# Patient Record
Sex: FEMALE | Race: WHITE | Employment: FULL TIME | URBAN - METROPOLITAN AREA
[De-identification: names, ages, dates, MRNs, and addresses within clinical notes are randomized per-mention and may not be internally consistent; named-entity substitution may affect disease eponyms.]

---

## 2024-07-21 ENCOUNTER — APPOINTMENT (EMERGENCY)
Dept: RADIOLOGY | Facility: HOSPITAL | Age: 34
End: 2024-07-21
Payer: COMMERCIAL

## 2024-07-21 ENCOUNTER — HOSPITAL ENCOUNTER (EMERGENCY)
Facility: HOSPITAL | Age: 34
Discharge: HOME | End: 2024-07-21
Attending: EMERGENCY MEDICINE
Payer: COMMERCIAL

## 2024-07-21 VITALS
DIASTOLIC BLOOD PRESSURE: 60 MMHG | SYSTOLIC BLOOD PRESSURE: 119 MMHG | RESPIRATION RATE: 18 BRPM | WEIGHT: 150 LBS | OXYGEN SATURATION: 99 % | TEMPERATURE: 99 F | BODY MASS INDEX: 24.11 KG/M2 | HEART RATE: 98 BPM | HEIGHT: 66 IN

## 2024-07-21 DIAGNOSIS — M25.511 ACUTE PAIN OF RIGHT SHOULDER: Primary | ICD-10-CM

## 2024-07-21 DIAGNOSIS — M89.8X1 PAIN OF RIGHT CLAVICLE: ICD-10-CM

## 2024-07-21 PROCEDURE — 99283 EMERGENCY DEPT VISIT LOW MDM: CPT

## 2024-07-21 PROCEDURE — 73030 X-RAY EXAM OF SHOULDER: CPT

## 2024-07-21 PROCEDURE — 99284 EMERGENCY DEPT VISIT MOD MDM: CPT | Performed by: EMERGENCY MEDICINE

## 2024-07-21 PROCEDURE — 73000 X-RAY EXAM OF COLLAR BONE: CPT

## 2024-07-21 RX ORDER — LIDOCAINE 50 MG/G
1 PATCH TOPICAL ONCE
Status: DISCONTINUED | OUTPATIENT
Start: 2024-07-21 | End: 2024-07-21 | Stop reason: HOSPADM

## 2024-07-21 RX ADMIN — LIDOCAINE 1 PATCH: 700 PATCH TOPICAL at 08:42

## 2024-07-21 NOTE — ED PROVIDER NOTES
"History  Chief Complaint   Patient presents with    Shoulder Pain     Patient c/o R deep achy shoulder pain that started yesterday and worsened this morning. Pt reports, \"I was in the pool with my little sisters sitting on my shoulders\". Pt self administered 600 mg Advil at 0800 with minimal relief. Denies numbness and tingling.      34-year-old female presents to the emergency department for evaluation of right shoulder/collarbone pain.  The patient states that she was playing in the pool yesterday and had her sister standing on her shoulders.  She reports waking up this morning with increased pain to the area.  States that she took 600 mg of Advil prior to arrival.  Reports that she is concerned that she might have a fracture so came to the emergency department for further evaluation.  She denies any recent falls.  No localized numbness, tingling or weakness.        None       No past medical history on file.    No past surgical history on file.    No family history on file.  I have reviewed and agree with the history as documented.    E-Cigarette/Vaping    E-Cigarette Use Never User      E-Cigarette/Vaping Substances     Social History     Tobacco Use    Smoking status: Never    Smokeless tobacco: Never   Vaping Use    Vaping status: Never Used   Substance Use Topics    Alcohol use: Yes     Comment: Occassionally    Drug use: Not Currently       Review of Systems   Constitutional:  Negative for chills and fever.   HENT:  Negative for ear pain and sore throat.    Eyes:  Negative for pain and visual disturbance.   Respiratory:  Negative for cough and shortness of breath.    Cardiovascular:  Negative for chest pain and palpitations.   Gastrointestinal:  Negative for abdominal pain and vomiting.   Genitourinary:  Negative for dysuria and hematuria.   Musculoskeletal:  Negative for arthralgias and back pain.   Skin:  Negative for color change and rash.   Neurological:  Negative for seizures and syncope.   All other " systems reviewed and are negative.      Physical Exam  Physical Exam  Vitals and nursing note reviewed.   Constitutional:       General: She is not in acute distress.     Appearance: She is well-developed.   HENT:      Head: Normocephalic and atraumatic.   Eyes:      Conjunctiva/sclera: Conjunctivae normal.   Cardiovascular:      Rate and Rhythm: Normal rate and regular rhythm.      Pulses:           Radial pulses are 2+ on the right side and 2+ on the left side.      Heart sounds: No murmur heard.  Pulmonary:      Effort: Pulmonary effort is normal. No respiratory distress.      Breath sounds: Normal breath sounds.   Chest:       Abdominal:      Palpations: Abdomen is soft.      Tenderness: There is no abdominal tenderness.   Musculoskeletal:         General: Tenderness present. No swelling.      Right shoulder: Tenderness present.        Arms:       Cervical back: Neck supple.      Comments: Distal upper extremity pulse, motor and sensation intact and symmetric bilaterally   Skin:     General: Skin is warm and dry.      Capillary Refill: Capillary refill takes less than 2 seconds.   Neurological:      Mental Status: She is alert.   Psychiatric:         Mood and Affect: Mood normal.         Vital Signs  ED Triage Vitals [07/21/24 0824]   Temperature Pulse Respirations Blood Pressure SpO2   99 °F (37.2 °C) 98 18 119/60 99 %      Temp Source Heart Rate Source Patient Position - Orthostatic VS BP Location FiO2 (%)   Oral Monitor Lying Left arm --      Pain Score       --           Vitals:    07/21/24 0824   BP: 119/60   Pulse: 98   Patient Position - Orthostatic VS: Lying         Visual Acuity      ED Medications  Medications   lidocaine (LIDODERM) 5 % patch 1 patch (1 patch Topical Medication Applied 7/21/24 0842)       Diagnostic Studies  Results Reviewed       None                   XR clavicle RIGHT   ED Interpretation by Robinson Mccormick MD (07/21 0901)   No acute fracture or dislocation      XR shoulder 2+  views RIGHT   ED Interpretation by Robinson Mccormick MD (07/21 0901)   No acute fracture or dislocation                 Procedures  Procedures         ED Course                   SBIRT 20yo+      Flowsheet Row Most Recent Value   Initial Alcohol Screen: US AUDIT-C     1. How often do you have a drink containing alcohol? 0 Filed at: 07/21/2024 0829   2. How many drinks containing alcohol do you have on a typical day you are drinking?  0 Filed at: 07/21/2024 0829   3b. FEMALE Any Age, or MALE 65+: How often do you have 4 or more drinks on one occassion? 0 Filed at: 07/21/2024 0829   Audit-C Score 0 Filed at: 07/21/2024 0829   THEA: How many times in the past year have you...    Used an illegal drug or used a prescription medication for non-medical reasons? Never Filed at: 07/21/2024 0829                      Medical Decision Making  34-year-old female presented to the emergency department for evaluation of right shoulder/clavicle pain.  On arrival the patient is awake, alert, oriented and in no acute distress.  Initial vital signs are within normal limits.  Patient treated with a Lidoderm patch.  Imaging done in the emergency department on my interpretation with no acute fracture or dislocations.  Patient was provided with a sling for comfort.  All diagnostic studies were discussed with the patient in detail.  She is appropriate for discharge.  Recommendation was made for the patient to follow-up with her PCP for continued symptoms.  Return precautions were discussed.    Patient agrees with the plan for discharge and feels comfortable to go home with proper f/u. Advised to return for worsening or additional problems. Diagnostic tests were reviewed and questions answered. Diagnosis, care plan and treatment options were discussed. The patient understands instructions and will follow up as directed.        Amount and/or Complexity of Data Reviewed  Radiology: ordered and independent interpretation  performed.    Risk  Prescription drug management.                 Disposition  Final diagnoses:   Acute pain of right shoulder   Pain of right clavicle     Time reflects when diagnosis was documented in both MDM as applicable and the Disposition within this note       Time User Action Codes Description Comment    7/21/2024  9:02 AM Robinson Mccormick [M25.511] Acute pain of right shoulder     7/21/2024  9:06 AM Robinson Mccormick [M89.8X1] Pain of right clavicle           ED Disposition       ED Disposition   Discharge    Condition   Stable    Date/Time   Sun Jul 21, 2024 0902    Comment   Luz Elena Shipman discharge to home/self care.                   Follow-up Information       Follow up With Specialties Details Why Contact Info Additional Information    [facility] Emergency Department Emergency Medicine Go to  If symptoms worsen [address] [place], [place] [id number]  [phone] [facility] Emergency Department, [address] [place] [phone]    [facility] Schedule an appointment as soon as possible for a visit   [address] [place] [id number] [phone] [facility] [address] [phone]            There are no discharge medications for this patient.      No discharge procedures on file.    PDMP Review       None            ED Provider  Electronically Signed by             Robinson Mccormick MD  07/21/24 2676

## 2024-07-21 NOTE — Clinical Note
Luz Elena Shipman was seen and treated in our emergency department on 7/21/2024.    No restrictions            Diagnosis: Shoulder pain    Luz Elena  may return to work on return date.    She may return on this date: 07/23/2024         If you have any questions or concerns, please don't hesitate to call.      Robinson Mccormick MD    ______________________________           _______________          _______________  Hospital Representative                              Date                                Time